# Patient Record
Sex: MALE | Race: WHITE | NOT HISPANIC OR LATINO | Employment: STUDENT | URBAN - METROPOLITAN AREA
[De-identification: names, ages, dates, MRNs, and addresses within clinical notes are randomized per-mention and may not be internally consistent; named-entity substitution may affect disease eponyms.]

---

## 2017-06-16 ENCOUNTER — HOSPITAL ENCOUNTER (EMERGENCY)
Facility: HOSPITAL | Age: 37
Discharge: PSYCHIATRIC OR SUBSTANCE USE FACILITY | End: 2017-06-17
Attending: EMERGENCY MEDICINE | Admitting: EMERGENCY MEDICINE
Payer: COMMERCIAL

## 2017-06-16 DIAGNOSIS — R41.0 CONFUSION: ICD-10-CM

## 2017-06-16 DIAGNOSIS — F32.A DEPRESSION WITH SUICIDAL IDEATION: Primary | ICD-10-CM

## 2017-06-16 DIAGNOSIS — R45.851 DEPRESSION WITH SUICIDAL IDEATION: Primary | ICD-10-CM

## 2017-06-16 LAB
ALBUMIN SERPL BCP-MCNC: 4.4 G/DL
ALP SERPL-CCNC: 82 U/L
ALT SERPL W/O P-5'-P-CCNC: 49 U/L
AMPHET+METHAMPHET UR QL: NEGATIVE
ANION GAP SERPL CALC-SCNC: 15 MMOL/L
APAP SERPL-MCNC: <3 UG/ML
AST SERPL-CCNC: 39 U/L
BARBITURATES UR QL SCN>200 NG/ML: NEGATIVE
BASOPHILS # BLD AUTO: 0.04 K/UL
BASOPHILS NFR BLD: 0.4 %
BENZODIAZ UR QL SCN>200 NG/ML: NORMAL
BILIRUB SERPL-MCNC: 0.7 MG/DL
BILIRUB UR QL STRIP: NEGATIVE
BUN SERPL-MCNC: 11 MG/DL
BZE UR QL SCN: NEGATIVE
CALCIUM SERPL-MCNC: 9.6 MG/DL
CANNABINOIDS UR QL SCN: NORMAL
CHLORIDE SERPL-SCNC: 108 MMOL/L
CLARITY UR REFRACT.AUTO: CLEAR
CO2 SERPL-SCNC: 18 MMOL/L
COLOR UR AUTO: NORMAL
CREAT SERPL-MCNC: 0.9 MG/DL
CREAT UR-MCNC: 49 MG/DL
DIFFERENTIAL METHOD: ABNORMAL
EOSINOPHIL # BLD AUTO: 0.1 K/UL
EOSINOPHIL NFR BLD: 0.8 %
ERYTHROCYTE [DISTWIDTH] IN BLOOD BY AUTOMATED COUNT: 12.4 %
EST. GFR  (AFRICAN AMERICAN): >60 ML/MIN/1.73 M^2
EST. GFR  (NON AFRICAN AMERICAN): >60 ML/MIN/1.73 M^2
ETHANOL SERPL-MCNC: 116 MG/DL
ETHANOL SERPL-MCNC: 164 MG/DL
GLUCOSE SERPL-MCNC: 91 MG/DL
GLUCOSE UR QL STRIP: NEGATIVE
HCT VFR BLD AUTO: 46.5 %
HGB BLD-MCNC: 16.7 G/DL
HGB UR QL STRIP: NEGATIVE
KETONES UR QL STRIP: NEGATIVE
LEUKOCYTE ESTERASE UR QL STRIP: NEGATIVE
LYMPHOCYTES # BLD AUTO: 3.5 K/UL
LYMPHOCYTES NFR BLD: 34.7 %
MCH RBC QN AUTO: 31.9 PG
MCHC RBC AUTO-ENTMCNC: 35.9 %
MCV RBC AUTO: 89 FL
METHADONE UR QL SCN>300 NG/ML: NEGATIVE
MONOCYTES # BLD AUTO: 0.5 K/UL
MONOCYTES NFR BLD: 5 %
NEUTROPHILS # BLD AUTO: 5.9 K/UL
NEUTROPHILS NFR BLD: 58.9 %
NITRITE UR QL STRIP: NEGATIVE
OPIATES UR QL SCN: NEGATIVE
PCP UR QL SCN>25 NG/ML: NEGATIVE
PH UR STRIP: 5 [PH] (ref 5–8)
PLATELET # BLD AUTO: 256 K/UL
PMV BLD AUTO: 11.3 FL
POTASSIUM SERPL-SCNC: 4.1 MMOL/L
PROT SERPL-MCNC: 8.3 G/DL
PROT UR QL STRIP: NEGATIVE
RBC # BLD AUTO: 5.23 M/UL
SODIUM SERPL-SCNC: 141 MMOL/L
SP GR UR STRIP: 1 (ref 1–1.03)
TOXICOLOGY INFORMATION: NORMAL
TROPONIN I SERPL DL<=0.01 NG/ML-MCNC: <0.006 NG/ML
TSH SERPL DL<=0.005 MIU/L-ACNC: 1.31 UIU/ML
URN SPEC COLLECT METH UR: NORMAL
UROBILINOGEN UR STRIP-ACNC: NEGATIVE EU/DL
WBC # BLD AUTO: 10.06 K/UL

## 2017-06-16 PROCEDURE — 85025 COMPLETE CBC W/AUTO DIFF WBC: CPT

## 2017-06-16 PROCEDURE — 81003 URINALYSIS AUTO W/O SCOPE: CPT

## 2017-06-16 PROCEDURE — 93010 ELECTROCARDIOGRAM REPORT: CPT | Mod: S$GLB,,, | Performed by: INTERNAL MEDICINE

## 2017-06-16 PROCEDURE — 80307 DRUG TEST PRSMV CHEM ANLYZR: CPT

## 2017-06-16 PROCEDURE — 80053 COMPREHEN METABOLIC PANEL: CPT

## 2017-06-16 PROCEDURE — 84443 ASSAY THYROID STIM HORMONE: CPT

## 2017-06-16 PROCEDURE — 80329 ANALGESICS NON-OPIOID 1 OR 2: CPT

## 2017-06-16 PROCEDURE — 84484 ASSAY OF TROPONIN QUANT: CPT

## 2017-06-16 PROCEDURE — 80320 DRUG SCREEN QUANTALCOHOLS: CPT | Mod: 91

## 2017-06-16 RX ORDER — OLANZAPINE 5 MG/1
10 TABLET ORAL EVERY 6 HOURS PRN
Status: DISCONTINUED | OUTPATIENT
Start: 2017-06-16 | End: 2017-06-17 | Stop reason: HOSPADM

## 2017-06-16 RX ORDER — DIAZEPAM 5 MG/1
10 TABLET ORAL EVERY 6 HOURS PRN
Status: DISCONTINUED | OUTPATIENT
Start: 2017-06-17 | End: 2017-06-17 | Stop reason: HOSPADM

## 2017-06-16 NOTE — ED PROVIDER NOTES
Encounter Date: 6/16/2017    SCRIBE #1 NOTE: I, Samuel Oseas, am scribing for, and in the presence of, Dr. Gupta.       History     Chief Complaint   Patient presents with    Psychiatric Evaluation     wife had to wrestle gun away from pt; pt disoriented and did know who wife was; did smoke marijuana this morning; did have four beers around 1400; hx of depression, has not taken prozac in two months     Review of patient's allergies indicates:  No Known Allergies  Time seen by provider: 6:16 PM    This is a 36 y.o. male with pertinent PMHx of depression and seizures who presents to the ED after a suicide attempt with a firearm today. Pt states that he has been depressed for some time now and today he attempted suicide with a firearm and his wife wrestled the gun out of his hands and locked it up. Pt notes that he had previously seen a psychiatrist ~ 1 year ago and was put on Prozac but never took it regularly and has not seen a psychiatrist again since. Pt also endorses mariajuana and alcohol (4 beers) use today. Pt states he has been smoking mariajuana for 22 years and drinks frequently though not daily. Pt also endorses chewing tobacco but denies any other drug use. Pt also denies any chronic physical medical problems. Pt does endorse secondary symptoms of subjective low grade fever for some time, chronic shortness of breath at rest, abdominal pain x 2 months with nausea or vomiting though none right now, double vision (described as feeling like his eyes are crossed), and 4 episodes of loose stool in the last 6 days. Pt also feels he is having speech difficulty but family has not noticed any speech abnormalities in the pt. Pt also endorses hematuria one month ago for which was diagnosed as a UTI and he already gotten antibiotics for it. Pt additionally denies homicidal ideation, cough, cold symptoms, chest pain, weakness, or urinary symptoms. Pt also endorses chronic back pain that is at baseline. There are no  other associated symptoms or mitigating/aggravating factors reported at this time.       The history is provided by the patient and the spouse.     Past Medical History:   Diagnosis Date    Depression     Seizures      History reviewed. No pertinent surgical history.  History reviewed. No pertinent family history.  Social History   Substance Use Topics    Smoking status: Never Smoker    Smokeless tobacco: Current User     Types: Chew    Alcohol use Yes     Review of Systems   Constitutional: Positive for fever (low grade subjective). Negative for chills.   HENT: Negative for congestion.    Eyes: Negative for pain.   Respiratory: Positive for shortness of breath. Negative for cough.    Cardiovascular: Negative for chest pain.   Gastrointestinal: Positive for abdominal pain (none now), nausea (none now) and vomiting (none now).   Genitourinary: Negative for difficulty urinating and dysuria.   Musculoskeletal: Negative for back pain and neck pain.   Skin: Negative for rash.   Neurological: Negative for speech difficulty (questionable pt endorses, family denies), weakness and headaches.   Psychiatric/Behavioral: Positive for dysphoric mood and suicidal ideas (attempt today, no HI).       Physical Exam     Initial Vitals [06/16/17 1759]   BP Pulse Resp Temp SpO2   128/85 (!) 114 18 99.2 °F (37.3 °C) 96 %     Physical Exam    Nursing note and vitals reviewed.  Constitutional: He appears well-developed and well-nourished.   HENT:   Head: Normocephalic and atraumatic.   Mouth/Throat: Mucous membranes are dry.   Neck: Neck supple. No thyromegaly present.   Cardiovascular: Normal rate, regular rhythm and intact distal pulses.   Pulmonary/Chest: Breath sounds normal. No respiratory distress. He has no wheezes. He has no rhonchi. He has no rales.   Abdominal: Soft. Bowel sounds are normal. He exhibits no distension. There is no tenderness. There is no rebound and no guarding.   Musculoskeletal: Normal range of motion. He  exhibits no tenderness.   Lymphadenopathy:     He has no cervical adenopathy.   Neurological: He is alert and oriented to person, place, and time. He has normal strength. No cranial nerve deficit or sensory deficit.   Skin: Skin is warm and dry.   Psychiatric: Thought content normal.   Depressed mood/affect.          ED Course   Procedures  Labs Reviewed   CBC W/ AUTO DIFFERENTIAL - Abnormal; Notable for the following:        Result Value    MCH 31.9 (*)     All other components within normal limits   COMPREHENSIVE METABOLIC PANEL - Abnormal; Notable for the following:     CO2 18 (*)     ALT 49 (*)     All other components within normal limits   ALCOHOL,MEDICAL (ETHANOL) - Abnormal; Notable for the following:     Alcohol, Medical, Serum 164 (*)     All other components within normal limits   ACETAMINOPHEN LEVEL - Abnormal; Notable for the following:     Acetaminophen (Tylenol), Serum <3.0 (*)     All other components within normal limits   ALCOHOL,MEDICAL (ETHANOL) - Abnormal; Notable for the following:     Alcohol, Medical, Serum 116 (*)     All other components within normal limits   TSH   DRUG SCREEN PANEL, URINE EMERGENCY    Narrative:     Preferred Collection Type->Urine, Clean Catch   URINALYSIS, REFLEX TO URINE CULTURE    Narrative:     Preferred Collection Type->Urine, Clean Catch   TROPONIN I   TROPONIN I    Narrative:     ADD-ON TROP #[id number] PER SUJEY OLIVERA MD 21:52  06/16/2017      EKG Readings: (Independently Interpreted)   Heart Rate: 101.   Sinus tachycardia, ST depressions and T wave inversions in leads 3 and aVF that are non specific with no other acute ST/T wave changes and no ectopy.        X-Rays:   Independently Interpreted Readings:   Chest X-Ray: Normal cardiac silhouette, no acute infiltrate, no CHF.    CT scan of head:  No acute intracranial abnormality.  Medical Decision Making:   History:   Old Medical Records: I decided to obtain old medical records.  Initial Assessment:   My initial  differential diagnoses include but are not limited to depression, SI, metabolic abnormality, complication of medical illness, emotional state.   Independently Interpreted Test(s):   I have ordered and independently interpreted X-rays - see prior notes.  I have ordered and independently interpreted EKG Reading(s) - see prior notes  Clinical Tests:   Lab Tests: Ordered and Reviewed  Radiological Study: Ordered and Reviewed  Medical Tests: Ordered and Reviewed  Other:   I have discussed this case with another health care provider.            Scribe Attestation:   Scribe #1: I performed the above scribed service and the documentation accurately describes the services I performed. I attest to the accuracy of the note.    Attending Attestation:           Physician Attestation for Scribe:  Physician Attestation Statement for Scribe #1: I, Dr. Gupta, reviewed documentation, as scribed by Samuel Farooq in my presence, and it is both accurate and complete.         Attending ED Notes:   8:24 pm  Pt is now medically stable for discharge from the ED and admission to a psychiatric unit. Pt's urine tox screen was positive for mariajuana and benzodiazepines. Pt's ALT was minimally elevated at 49 but all other LFT's were normal. Pt's blood alcohol is at 164. Labs are otherwise unremarkable, UA is negative and there is no evidence of UTI.   Repeat blood alcohol at 21:45 116.        ED Course   pt will be given zyprexa 10 mg po and then every 6 hours as needed for agitation.  Seen by Psych.  Will need transfer to a Psych facility.  Clinical Impression:   The primary encounter diagnosis was Depression with suicidal ideation. A diagnosis of Confusion was also pertinent to this visit.          Héctor Gupta MD  06/16/17 5947

## 2017-06-16 NOTE — ED NOTES
"Pt arrived from home with own handcuffs on. Per wife, pt stated "please put these on me I don't trust myself." Wife has zimmer to handcuffs   "

## 2017-06-16 NOTE — Clinical Note
Andrea KIRILL Travis should be transferred out to a behavioral health facility.  Patient is medically stable for discharge from the medical emergency department and admission to a psychiatric facility.

## 2017-06-16 NOTE — ED NOTES
APPEARANCE: awake and alert in NAD.  SKIN: warm, dry and intact. No breakdown or bruising.  MUSCULOSKELETAL: Patient moving all extremities spontaneously, no obvious swelling or deformities noted. Ambulates independently.  RESPIRATORY: no shortness of breath. All breath sounds CTA bilaterally.  CARDIAC: heart tones normal. Regular rate and rhythm; 2+ distal pulses; no peripheral edema  ABDOMEN: S/ND/NT, normoactive bowel sounds present in all four quadrants. Normal stool pattern.  : voids spontaneously without difficulty.  Neurologic: AAO x 4; follows commands equal strength in all extremities; denies numbness/tingling.

## 2017-06-17 VITALS
TEMPERATURE: 99 F | HEIGHT: 70 IN | RESPIRATION RATE: 16 BRPM | SYSTOLIC BLOOD PRESSURE: 145 MMHG | DIASTOLIC BLOOD PRESSURE: 80 MMHG | HEART RATE: 78 BPM | OXYGEN SATURATION: 98 % | WEIGHT: 190 LBS | BODY MASS INDEX: 27.2 KG/M2

## 2017-06-17 LAB — ETHANOL SERPL-MCNC: 89 MG/DL

## 2017-06-17 PROCEDURE — 25000003 PHARM REV CODE 250: Performed by: EMERGENCY MEDICINE

## 2017-06-17 PROCEDURE — 99285 EMERGENCY DEPT VISIT HI MDM: CPT | Mod: ,,, | Performed by: EMERGENCY MEDICINE

## 2017-06-17 PROCEDURE — 93005 ELECTROCARDIOGRAM TRACING: CPT

## 2017-06-17 PROCEDURE — 99285 EMERGENCY DEPT VISIT HI MDM: CPT | Mod: 25

## 2017-06-17 PROCEDURE — 80320 DRUG SCREEN QUANTALCOHOLS: CPT

## 2017-06-17 RX ADMIN — OLANZAPINE 10 MG: 10 TABLET, FILM COATED ORAL at 12:06

## 2017-06-17 NOTE — ED NOTES
Security arrived. Two security guards and RN are present to escort pt to vehicle. Patient is calm and cooperative.  called.

## 2017-06-17 NOTE — ED NOTES
SPD arrived. Security called. Pt belongings, PEC and pt documentation given to SPD. Pt is calm and cooperative. Awaiting for security arrival. Notified pt wife (Juliet) of pt transfer to [facility].

## 2017-06-17 NOTE — ED NOTES
pec faxed to Counts include 234 beds at the [facility], [facility], [place], [place], [facility], [facility], [place], [facility], [facility], [facility], [place] [facility], [facility] [place], [facility], [facility], [place], [place],  [place], [place], [place], [place], [place], [facility], [place], [place], [place], [place], [facility], [place], [place], [place], [facility]
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
## 2017-06-17 NOTE — ED NOTES
Still actively seeking placement. PEC re faxed to [facility],  [facility], [place]  [facility], [place] [facility],  [facility], [facility], [facility], [place], [facility], [facility], [facility] [place], [facility] [place], [place], [facility], [place] [facility], [place], [place], [place], [place], [place], [place], [place], [place], [place] Jerem[place], Dequan, Sandor, Rosy, Sumit, Funmi, Ea Rodas

## 2017-06-17 NOTE — ED NOTES
Received call from Kulwant LAN, Pt has been accepted by Dr. Herrera at [facility], located at  [address] [place] [address]. RIKY Blum asked to wait 30 minutes before calling report to  [phone].

## 2017-06-17 NOTE — ED NOTES
[facility], [facility],  [facility], [facility],  [facility], [place] , [place], [facility], [facility], [facility], raz, [facility], [facility],  [facility],  [place]uyen, jose alberto, armida,  [place], [place], [facility],  Sean, tyrone, [place] jami, kulwant, [facility], tika, ti, yaima, keyur nicholson,

## 2020-01-20 ENCOUNTER — HOSPITAL ENCOUNTER (EMERGENCY)
Facility: HOSPITAL | Age: 40
Discharge: HOME | End: 2020-01-20
Attending: EMERGENCY MEDICINE
Payer: COMMERCIAL

## 2020-01-20 VITALS
BODY MASS INDEX: 31.78 KG/M2 | SYSTOLIC BLOOD PRESSURE: 147 MMHG | OXYGEN SATURATION: 97 % | TEMPERATURE: 98 F | RESPIRATION RATE: 20 BRPM | HEART RATE: 71 BPM | DIASTOLIC BLOOD PRESSURE: 99 MMHG | WEIGHT: 222 LBS | HEIGHT: 70 IN

## 2020-01-20 DIAGNOSIS — R51.9 OCCIPITAL HEADACHE: Primary | ICD-10-CM

## 2020-01-20 DIAGNOSIS — R51.9 MIXED HEADACHE: ICD-10-CM

## 2020-01-20 PROCEDURE — 64405 NJX AA&/STRD GR OCPL NRV: CPT | Mod: RT,,, | Performed by: PHYSICIAN ASSISTANT

## 2020-01-20 PROCEDURE — 64405 NJX AA&/STRD GR OCPL NRV: CPT

## 2020-01-20 PROCEDURE — 64405 PR NERVE BLOCK INJ, ANES/STEROID, OCCIPITAL: ICD-10-PCS | Mod: RT,,, | Performed by: PHYSICIAN ASSISTANT

## 2020-01-20 PROCEDURE — 96372 THER/PROPH/DIAG INJ SC/IM: CPT

## 2020-01-20 PROCEDURE — S0020 INJECTION, BUPIVICAINE HYDRO: HCPCS | Performed by: PHYSICIAN ASSISTANT

## 2020-01-20 PROCEDURE — 99284 EMERGENCY DEPT VISIT MOD MDM: CPT | Mod: 25,27

## 2020-01-20 PROCEDURE — 99284 PR EMERGENCY DEPT VISIT,LEVEL IV: ICD-10-PCS | Mod: 25,,, | Performed by: PHYSICIAN ASSISTANT

## 2020-01-20 PROCEDURE — 25000003 PHARM REV CODE 250: Performed by: PHYSICIAN ASSISTANT

## 2020-01-20 PROCEDURE — 99284 EMERGENCY DEPT VISIT MOD MDM: CPT | Mod: 25,,, | Performed by: PHYSICIAN ASSISTANT

## 2020-01-20 RX ORDER — BUPIVACAINE HYDROCHLORIDE 5 MG/ML
5 INJECTION, SOLUTION EPIDURAL; INTRACAUDAL
Status: COMPLETED | OUTPATIENT
Start: 2020-01-20 | End: 2020-01-20

## 2020-01-20 RX ADMIN — BUPIVACAINE HYDROCHLORIDE 25 MG: 5 INJECTION, SOLUTION EPIDURAL; INTRACAUDAL; PERINEURAL at 03:01

## 2020-01-20 NOTE — ED PROVIDER NOTES
Encounter Date: 1/20/2020       History     Chief Complaint   Patient presents with    Headache     r side behind ear for week     Mr Travis is a 39yoM who presents for intermittent headache x1 week; pertinent PMHx h/o depression.  Patient reports 1 week of posterior right-sided headache that occurs intermittently very frequently.  Came on while he was sitting down watching the football game.  About every 10-15 seconds, he experiences a sharp electrolyte pain in the back of his head that radiates forward.  No facial pain or neck pain. Associated with light sensitivity and sound sensitivity.  No history of migraines.  He otherwise has been in usual state of health, has been able to go to work and exercise as usual without worsening of symptoms. Denies associated fever, chills, nausea, vomiting, weakness, numbness, confusion.  No trauma.  The patients available PMH, PSH, Social History, medications, allergies, and triage vital signs were reviewed just prior to their medical evaluation.  A ten point review of systems was completed and is negative except as documented above.  Patient denies any other acute medical complaint.    Please be advised this text was dictated with LearnBIG software and may contain errors due to translation.           Review of patient's allergies indicates:  No Known Allergies  Past Medical History:   Diagnosis Date    Depression     Seizures      No past surgical history on file.  No family history on file.  Social History     Tobacco Use    Smoking status: Never Smoker    Smokeless tobacco: Current User     Types: Chew   Substance Use Topics    Alcohol use: Yes    Drug use: Not on file     Review of Systems   Constitutional: Negative for chills and fever.   Eyes: Positive for photophobia (mild). Negative for visual disturbance.   Respiratory: Negative for shortness of breath.    Cardiovascular: Negative for chest pain.   Gastrointestinal: Negative for abdominal pain, nausea and  vomiting.   Genitourinary: Negative for dysuria.   Musculoskeletal: Negative for neck pain and neck stiffness.   Skin: Negative for rash.   Neurological: Positive for headaches. Negative for dizziness, seizures, syncope, speech difficulty, weakness, light-headedness and numbness.   Psychiatric/Behavioral: Negative for confusion.       Physical Exam     Initial Vitals [01/20/20 1405]   BP Pulse Resp Temp SpO2   (!) 141/85 67 18 98.1 °F (36.7 °C) 98 %      MAP       --         Physical Exam    Vitals reviewed.  Constitutional: He appears well-developed and well-nourished. He is not diaphoretic. No distress.   HENT:   Head: Normocephalic and atraumatic.   Right Ear: External ear normal.   Left Ear: External ear normal.   Mouth/Throat: Oropharynx is clear and moist. No oropharyngeal exudate.   No TTP over temporal artery  + TTP right occipital protuberance   Eyes: Conjunctivae and EOM are normal. Pupils are equal, round, and reactive to light. No scleral icterus.   Mild photophobia b/l, not severe     Neck: Normal range of motion. Neck supple.   No meningismus  No C spine TTP   Cardiovascular: Normal rate, regular rhythm and intact distal pulses.   Pulmonary/Chest: Breath sounds normal. No respiratory distress. He has no wheezes. He has no rhonchi. He has no rales.   Abdominal: Soft.   Musculoskeletal: He exhibits no edema.   Neurological: He is alert and oriented to person, place, and time. He has normal strength. No cranial nerve deficit or sensory deficit.   No focal nor global neuro deficits, finger to nose, heel to shin, romberg negative  Gait nml   Skin: Skin is warm and dry. No rash (none) noted.   Psychiatric: He has a normal mood and affect. His behavior is normal. Judgment and thought content normal.         ED Course   Nerve Block  Date/Time: 1/23/2020 8:55 PM  Performed by: Jeimy Lemons PA-C  Authorized by: Gabriel Mcmillan MD   Pre-operative diagnosis: mixed occipital headache  Post-operative  diagnosis: occipital headache   Indications: pain relief  Body area: head  Nerve: greater occipital  Laterality: right  Patient sedated: no  Preparation: Patient was prepped and draped in the usual sterile fashion.  Patient position: sitting  Needle size: 25 G  Location technique: anatomical landmarks  Local Anesthetic: bupivacaine 0.5% without epinephrine  Anesthetic total: 3 mL  Complications: No  Estimated blood loss (mL): 0  Outcome: pain improved  Patient tolerance: Patient tolerated the procedure well with no immediate complications        Labs Reviewed - No data to display       Imaging Results    None          Medical Decision Making:   History:   Old Medical Records: I decided to obtain old medical records.  Old Records Summarized: records from clinic visits and records from previous admission(s).  Initial Assessment:   Patient presents with pulsating, electric like pain radiating from R occipital protuberance x 1 week, +mild associated photophobia, neuro exam unremarkable, no trauma  Differential Diagnosis:   DDx includes occipital headache, mixed migraine. Physical exam and history taking lower clinical suspicion for  CVA< vertebral artery dissection, meningitis, temporal arteritis.   ED Management:  Clinically, I do not suspect emergent etiology of patients symptoms at this time, no imaging yet indicated. Given greater occipital nerve block with 100% relief of symptoms, including mild photo/phonophobia. Discussed recurrence, PCP f/u, headache specialist if needed. Patient agreed to plan of care and voiced understanding. Discharged in stable condition with strict ED return precautions.    .mmed  01/23/2020    I discussed the following case, diagnosis and plan of care with attending physician.                                   Clinical Impression:       ICD-10-CM ICD-9-CM   1. Occipital headache R51 784.0   2. Mixed headache R51 784.0         Disposition:   Disposition: Discharged  Condition:  Stable                     Jeimy Lemons PA-C  01/23/20 7605

## 2020-01-20 NOTE — ED TRIAGE NOTES
Andrea Travis, an 39 y.o. male presents to the ED with a sharp throbbing headache to the right side of his head.  He said it throbs about ten times a minute and that he is photosensitive.  Denies dizziness.  Patient has had this headache x 1 week as of today.  Unrelieved by Aleve, Ibuprofen, or Tylenol.  Patient denies neck pain, numbness.        Review of patient's allergies indicates:  No Known Allergies  Chief Complaint   Patient presents with    Headache     r side behind ear for week     Past Medical History:   Diagnosis Date    Depression     Seizures

## 2020-01-20 NOTE — DISCHARGE INSTRUCTIONS
Take tylenol and advil if pain returns. Follow up with primary provider, seek neurology referral if headache becomes chronic. Return to the ED if you develop worsening pain, weakness, numbness or vision changes.    Our goal in the emergency department is to always give you outstanding care and exceptional service. You may receive a survey by mail or e-mail in the next week regarding your experience in our ED. We would greatly appreciate your completing and returning the survey. Your feedback provides us with a way to recognize our staff who give very good care and it helps us learn how to improve when your experience was below our aspiration of excellence.

## 2020-03-27 ENCOUNTER — TELEPHONE (OUTPATIENT)
Dept: SPINE | Facility: CLINIC | Age: 40
End: 2020-03-27

## 2020-03-27 NOTE — TELEPHONE ENCOUNTER
----- Message from Lakisha Cunningham PA-C sent at 3/27/2020  9:34 AM CDT -----  Contact: Juliet (spouse)      ----- Message -----  From: Kushal Estrada  Sent: 3/26/2020   2:53 PM CDT  To: Dominic FITZGERALD Staff    Type:  Patient Returning Call    Who Called: Juliet (spouse)    Who Left Message for Patient: Karen    Does the patient know what this is regarding?: yes    Would the patient rather a call back or a response via [facility]? call    Best Call Back Number: [phone]    Additional Information:

## 2020-03-27 NOTE — TELEPHONE ENCOUNTER
Spoke with  Thaddeus    I told her that Dr. Alfaro will be out of the office due to being pulled to work in the hospital but she is offering virtual visits or rescheduling appts to a later date. Juliet stated that patient would rather see her at a visit so it is ok to reschedule him.

## 2020-03-30 ENCOUNTER — TELEPHONE (OUTPATIENT)
Dept: SPINE | Facility: CLINIC | Age: 40
End: 2020-03-30

## 2020-03-30 NOTE — TELEPHONE ENCOUNTER
----- Message from Anu Mijares sent at 3/30/2020  1:22 PM CDT -----  Contact: Juliet (Wife)  Name of Who is Calling : Juliet (Wife)    Juliet (Wife) is requesting a call from staff in regards to getting scheduled for appointment  .....Please contact to further discuss and advise.    Can the clinic reply by [facility] : No    What Number to Call Back :  [phone]

## 2020-06-29 ENCOUNTER — OFFICE VISIT (OUTPATIENT)
Dept: SPINE | Facility: CLINIC | Age: 40
End: 2020-06-29
Attending: PHYSICAL MEDICINE & REHABILITATION
Payer: COMMERCIAL

## 2020-06-29 VITALS
DIASTOLIC BLOOD PRESSURE: 87 MMHG | BODY MASS INDEX: 31.56 KG/M2 | WEIGHT: 220.44 LBS | HEIGHT: 70 IN | SYSTOLIC BLOOD PRESSURE: 126 MMHG | HEART RATE: 95 BPM

## 2020-06-29 DIAGNOSIS — M70.61 GREATER TROCHANTERIC BURSITIS OF RIGHT HIP: Primary | ICD-10-CM

## 2020-06-29 PROCEDURE — 3008F PR BODY MASS INDEX (BMI) DOCUMENTED: ICD-10-PCS | Mod: CPTII,S$GLB,, | Performed by: PHYSICAL MEDICINE & REHABILITATION

## 2020-06-29 PROCEDURE — 99204 PR OFFICE/OUTPT VISIT, NEW, LEVL IV, 45-59 MIN: ICD-10-PCS | Mod: S$GLB,,, | Performed by: PHYSICAL MEDICINE & REHABILITATION

## 2020-06-29 PROCEDURE — 99999 PR PBB SHADOW E&M-EST. PATIENT-LVL IV: ICD-10-PCS | Mod: PBBFAC,,, | Performed by: PHYSICAL MEDICINE & REHABILITATION

## 2020-06-29 PROCEDURE — 99204 OFFICE O/P NEW MOD 45 MIN: CPT | Mod: S$GLB,,, | Performed by: PHYSICAL MEDICINE & REHABILITATION

## 2020-06-29 PROCEDURE — 99999 PR PBB SHADOW E&M-EST. PATIENT-LVL IV: CPT | Mod: PBBFAC,,, | Performed by: PHYSICAL MEDICINE & REHABILITATION

## 2020-06-29 PROCEDURE — 3008F BODY MASS INDEX DOCD: CPT | Mod: CPTII,S$GLB,, | Performed by: PHYSICAL MEDICINE & REHABILITATION

## 2020-06-29 RX ORDER — GABAPENTIN 300 MG/1
300-600 CAPSULE ORAL NIGHTLY
Qty: 60 CAPSULE | Refills: 2 | Status: SHIPPED | OUTPATIENT
Start: 2020-06-29

## 2020-06-29 RX ORDER — HYDROCODONE BITARTRATE AND ACETAMINOPHEN 7.5; 325 MG/1; MG/1
TABLET ORAL
COMMUNITY
Start: 2020-06-25

## 2020-06-29 RX ORDER — METHYLPREDNISOLONE 4 MG/1
TABLET ORAL
Qty: 1 PACKAGE | Refills: 0 | Status: SHIPPED | OUTPATIENT
Start: 2020-06-29 | End: 2020-07-20

## 2020-06-29 NOTE — PROGRESS NOTES
Subjective:      Patient ID: Andrea Travis is a 39 y.o. male.    Chief Complaint: Leg Pain and Hip Pain    Mr Travis is a 40 yo male here for evaluation of chronic hip and back pain.  He had a fall in October and since then he has been having right hip and thigh pain. The pain is on the outside of the thigh and goes up to the back.  It is constant.  It comes mainly in the evening.  He feels like the outer thigh is numb.  The numbness is the outer knee to midthigh on the outside of the leg.  The numbness has been present since October.  He hit on the hip bone.  He said the pain was severe.  The pain is worse with standing, lying on back and right side will make it worse.  He feels better lying on left side.  He has not done anything for it.  It is still there.  He does not feel like advil helps.  He broke his thumb and so was given hydrocodone.  The pain is 4/10 now, worst 10/10 at night, best 2/10 in the morning. The pain is a shooting pain up from midthigh up the side.  It goes up to the ribs on right flank but not into glute and lower back.      Past Medical History:  No date: Depression  No date: Seizures    History reviewed. No pertinent surgical history.    History reviewed.  No pertinent family history.      Social History    Socioeconomic History      Marital status:       Spouse name: Not on file      Number of children: Not on file      Years of education: Not on file      Highest education level: Not on file    Occupational History      Not on file    Social Needs      Financial resource strain: Not on file      Food insecurity        Worry: Not on file        Inability: Not on file      Transportation needs        Medical: Not on file        Non-medical: Not on file    Tobacco Use      Smoking status: Never Smoker      Smokeless tobacco: Current User        Types: Chew    Substance and Sexual Activity      Alcohol use: Yes      Drug use: Not on file      Sexual activity: Not on file     Lifestyle      Physical activity        Days per week: Not on file        Minutes per session: Not on file      Stress: Not on file    Relationships      Social connections        Talks on phone: Not on file        Gets together: Not on file        Attends [employer] service: Not on file        Active member of club or organization: Not on file        Attends meetings of clubs or organizations: Not on file        Relationship status: Not on file    Other Topics      Concerns:        Not on file    Social History Narrative      Not on file      Current Outpatient Medications:  HYDROcodone-acetaminophen (NORCO) 7.5-325 mg per tablet, TK 1 T PO TID FOR 3 DAYS, Disp: , Rfl:     No current facility-administered medications for this visit.       Review of patient's allergies indicates:  No Known Allergies        Review of Systems   Constitution: Negative for weight gain and weight loss.   Cardiovascular: Negative for chest pain.   Respiratory: Negative for shortness of breath.    Musculoskeletal: Positive for back pain (right flank) and joint pain. Negative for joint swelling.   Gastrointestinal: Negative for abdominal pain, bowel incontinence, nausea and vomiting.   Genitourinary: Negative for bladder incontinence.   Neurological: Positive for numbness (right lateral thigh from knee to midthigh).         Objective:        General: Andrea is well-developed, well-nourished, appears stated age, in no acute distress, alert and oriented to time, place and person.     General    Vitals reviewed.  Constitutional: He is oriented to person, place, and time. He appears well-developed and well-nourished.   HENT:   Head: Normocephalic and atraumatic.   Pulmonary/Chest: Effort normal.   Neurological: He is alert and oriented to person, place, and time.   Psychiatric: He has a normal mood and affect. His behavior is normal. Judgment and thought content normal.     General Musculoskeletal Exam   Gait: normal     Right Ankle/Foot Exam      Tests   Heel Walk: able to perform  Tiptoe Walk: able to perform    Left Ankle/Foot Exam     Tests   Heel Walk: able to perform  Tiptoe Walk: able to perform      Right Hip Exam     Tenderness   The patient tender to palpation of the SI joint, trochanteric bursa and piriformis.    Range of Motion   External rotation: normal   Internal rotation: normal     Tests   Pain w/ forced internal rotation (HALLE): present (right side pain)  Back (L-Spine & T-Spine) / Neck (C-Spine) Exam     Back (L-Spine & T-Spine) Range of Motion   Extension: 40   Flexion: 70 (with pain)   Lateral bend right: 20   Lateral bend left: 10 (with pain on right)   Rotation right: 40   Rotation left: 40     Spinal Sensation   Right Side Sensation  C-Spine Level: normal   L-Spine Level: normal  S-Spine Level: normal  Left Side Sensation  C-Spine Level: normal  L-Spine Level: normal  S-Spine Level: normal    Back (L-Spine & T-Spine) Tests   Right Side Tests  Straight leg raise:      Sitting SLR: > 70 degrees      Left Side Tests  Straight leg raise:     Sitting SLR: > 70 degrees          Other He has no scoliosis .  Spinal Kyphosis:  Absent      Muscle Strength   Right Upper Extremity   Biceps: 5/5/5   Deltoid:  5/5  Triceps:  5/5  Wrist extension: 5/5/5   Finger Flexors:  5/5  Left Upper Extremity  Biceps: 5/5/5   Deltoid:  5/5  Triceps:  5/5  Wrist extension: 5/5/5   Finger Flexors:  5/5  Right Lower Extremity   Hip Flexion: 5/5   Quadriceps:  5/5   Anterior tibial:  5/5/5  EHL:  5/5  Left Lower Extremity   Hip Flexion: 5/5   Quadriceps:  5/5   Anterior tibial:  5/5/5   EHL:  5/5    Reflexes     Left Side  Biceps:  2+  Triceps:  2+  Brachioradialis:  2+  Quadriceps:  2+  Achilles:  2+  Left Gallagher's Sign:  Absent  Babinski Sign:  absent    Right Side   Biceps:  2+  Triceps:  2+  Brachioradialis:  2+  Quadriceps:  2+  Achilles:  2+  Right Gallagher's Sign:  absent  Babinski Sign:  absent    Vascular Exam     Right Pulses        Carotid:                   2+    Left Pulses        Carotid:                  2+              Assessment:       1. Greater trochanteric bursitis of right hip           Plan:       Orders Placed This Encounter    X-Ray Lumbar Complete With Flex And Ext    X-Ray Hip 2 View Right    Ambulatory referral/consult to Physical/Occupational Therapy    gabapentin (NEURONTIN) 300 MG capsule    methylPREDNISolone (MEDROL DOSEPACK) 4 mg tablet        1. We discussed hip pain and the nature of hip pain.  He is not really having back pain.  I think all his pain is from the bursa and falling on it.  He does have tenderness over the trochanteric bursa  We discussed lateral thigh numbness is not likely from a nerve in back.  It is likely superficial nerve.    2. We discussed posture sitting and the importance of trying to sit better.   3. We discussed the benefits of therapy and exercise and continuing to move.  We discussed some stretches for ITB.  We discussed foam roller, and PT  4. PT for back and core strengthening, ITB stretches, and glute and quad strengthening, and myofascial relase at [facility]  5. Discussed bursa injection he wants to wait  6. X-ray lumbar spine and hip today  7. Medrol dose dwayne  8. Gabapentin 300-600 at night  9. RTC 3 months    More than 50% of the total time  of 45 minutes was spent face to face in counseling on diagnosis and treatment options. I also counseled patient  on common and most usual side effect of prescribed medications.  I reviewed Primary care , and other specialty's notes to better coordinate patient's care. All questions were answered, and patient voiced understanding.         Follow-up: Follow up in about 3 months (around 9/29/2020). If there are any questions prior to this, the patient was instructed to contact the office.

## 2020-07-01 ENCOUNTER — HOSPITAL ENCOUNTER (OUTPATIENT)
Dept: RADIOLOGY | Facility: HOSPITAL | Age: 40
Discharge: HOME | End: 2020-07-01
Attending: PHYSICAL MEDICINE & REHABILITATION
Payer: COMMERCIAL

## 2020-07-01 DIAGNOSIS — M70.61 GREATER TROCHANTERIC BURSITIS OF RIGHT HIP: ICD-10-CM

## 2020-07-01 PROCEDURE — 72110 X-RAY EXAM L-2 SPINE 4/>VWS: CPT | Mod: TC,PN

## 2020-07-01 PROCEDURE — 73502 X-RAY EXAM HIP UNI 2-3 VIEWS: CPT | Mod: 26,RT,, | Performed by: RADIOLOGY

## 2020-07-01 PROCEDURE — 72110 X-RAY EXAM L-2 SPINE 4/>VWS: CPT | Mod: 26,,, | Performed by: RADIOLOGY

## 2020-07-01 PROCEDURE — 73502 X-RAY EXAM HIP UNI 2-3 VIEWS: CPT | Mod: TC,PN,RT

## 2020-07-01 PROCEDURE — 73502 XR HIP 2 VIEW RIGHT: ICD-10-PCS | Mod: 26,RT,, | Performed by: RADIOLOGY

## 2020-07-01 PROCEDURE — 72110 XR LUMBAR SPINE 5 VIEW WITH FLEX AND EXT: ICD-10-PCS | Mod: 26,,, | Performed by: RADIOLOGY

## 2020-07-09 ENCOUNTER — CLINICAL SUPPORT (OUTPATIENT)
Dept: REHABILITATION | Facility: HOSPITAL | Age: 40
End: 2020-07-09
Attending: PHYSICAL MEDICINE & REHABILITATION
Payer: COMMERCIAL

## 2020-07-09 DIAGNOSIS — M62.9 HAMSTRING TIGHTNESS OF BOTH LOWER EXTREMITIES: ICD-10-CM

## 2020-07-09 DIAGNOSIS — M25.651 DECREASED RANGE OF MOTION OF BOTH HIPS: ICD-10-CM

## 2020-07-09 DIAGNOSIS — R25.9 DYSFUNCTIONAL MOVEMENTS: ICD-10-CM

## 2020-07-09 DIAGNOSIS — R26.89 DECREASED BACK MOBILITY: ICD-10-CM

## 2020-07-09 DIAGNOSIS — R52 PAIN AGGRAVATED BY LIFTING: ICD-10-CM

## 2020-07-09 DIAGNOSIS — M25.652 DECREASED RANGE OF MOTION OF BOTH HIPS: ICD-10-CM

## 2020-07-09 PROCEDURE — 97161 PT EVAL LOW COMPLEX 20 MIN: CPT | Mod: PO | Performed by: PHYSICAL THERAPIST

## 2020-07-09 NOTE — PROGRESS NOTES
[facility]  Physical Therapy Initial Evaluation    Name: Andrea Travis  Clinic Number: [id number]    Therapy Diagnosis:   Decreased range of motion both hips   Decreased back mobility  Hamstring tightness of both lower extremities  Dysfunctional movements  Pain aggravated by lifting    Physician: Valerie Alfaro, *    Physician Orders: PT Eval and Treat hips  Medical Diagnosis from Referral:   M70.61 (ICD-10-CM) - Greater trochanteric bursitis of right hip  Evaluation Date: 7/9/2020  Authorization Period Expiration: 12/31/2020  Plan of Care Expiration: 10/9/2020  Visit # / Visits authorized: 1/ 20    Time In: 1715  Time Out: 1610  Total Billable Time: 55  minutes    Precautions: Standard    Subjective      Medical History:   Past Medical History:   Diagnosis Date    Depression     Seizures        Surgical History:   Andrea Travis  has no past surgical history on file.    Medications:   Andrea has a current medication list which includes the following prescription(s): gabapentin, hydrocodone-acetaminophen, and methylprednisolone.    Allergies:   Review of patient's allergies indicates:  No Known Allergies        History of current condition - Andrea reports: he presently is experiencing a dull pain on the right side hip and side.  The pain is constant.   Date of onset: October 2019. Patient fell at home in his living room. No immediate pain. Progressively got worse. Currently it is unchanged.       Pain:  Current 6/10, worst 9/10, best 3/10   Location: right hip area from the hip upward to the iliac crest and right flank.   Description: Dull and Tingling  Aggravating Factors: Standing and Laying on the right side.   Easing Factors: walking, moving around  Sleep  - disturbed   B/B function - negative  Cough/ sneeze/ strain - + sneeze > pain   Current Level of Function:   Personal - no limitation   Domestic - lifting groceries or anything from the ground level.  Community  / social - no limitation   Occupation - not applicable at present   Recreation / fitness / sports - not able to go to the gym but able to exercise on home elliptical ~ 45 min   Prior Therapy: no   Social History:  lives with their spouse in a condominium. Stairs, outdoors, but also elevator use   Occupation: not employed  Prior Level of Function: no limitation prior to accident   Pts goals: to get a good night sleep and have feeling return right side of the thigh.   Imaging, X-Rays : 6/29/2020   FINDINGS:  Hip joint spaces appear adequately maintained on both sides, without significant narrowing on either side.  No conventional radiographic evidence to specifically suggest avascular necrosis of either femoral.  No evidence of recent or healing fracture or lytic destructive process.  SI joints appear unremarkable.  FINDINGS:  Five views: There is mild anterolisthesis of L4 on L5.  There is mild DJD.  No fracture dislocation bone destruction seen.  No instability seen.  No pars defects are seen.       Objective     Posture Alignment: decreased lumbar lordosis high left shoulder.  Sensation: Light Touch: Impaired: mid third of the right lateral thigh to light touch and sharp pain   Palpation: top nof the iliac crest, superior margin greater trochanter, right iliolumbar area.       Lumbar/Thoracic AROM: Pain/Dysfunction with Movement:   Flexion                          60/40=20 DP- hip area    Extension                      20/10=10 DP right iliolumbar    Right side bending            20 P right iliolumbar    Left side bending               30 NP    Right rotation DP - right iliac area    Left rotation DN       LOWER EXTREMITY STRENGTH:   Left  5/5 Right 5/5       DTR's:   Left Right   Patella Tendon 2+ 2+   Achilles Tendon 2+ 2+           Selective Functional Movement Assessment:  FN: functional, non-painful  FP: functional, painful  DP: dysfunctional, painful  DN: dysfunctional, non-painful    Multi-Segmental  Flexion: see above   Cannot touch toes  ? Sacral angle <70 degrees  ? Non-uniform spine curve  Multi-Segmental Extension: see above   ASIS does not clear toes  ? Spine of scapula does not clear heels  ? Non-Uniform spine curve  ? Excessive effort and/or lack motor control    Multi-Segmental Rotation:   Right:see above   Left:see above     FUNCTIONAL MOVEMENT BREAKOUTS:  Long sitting  = DP sacral angle 60 degrees  SLR   -Active   R - DN  45  L -  DN  45  Stabilized SLR   R - DN  L - DN  -Passive  R - DP 60  L - DN  Knees to chest   Active - DN 90     Press up  - DN    Hip extension   Active  R - FN  35  L - FN   40  Passive   R - FN  50  L - FN   50  Hip rotation   Internal  rotation  active  R 25  Passive  R 30  External  rotation   Active   R - 45   Passive   R - 50    GAIT: ambulates with no assistive device with independently.     GAIT DEVIATIONS: displays no deviations         CMS Impairment/Limitation/Restriction for FOTO hip Survey    Therapist reviewed FOTO scores for Andrea Travis on 7/9/2020.   FOTO documents entered into [employer] - see Media section.    Limitation Score: 51%  Category: Mobility    Current : CK = at least 40% but < 60% impaired, limited or restricted  Goal: CJ = at least 20% but < 40% impaired, limited or restricted               Assessment     Andrea is a 39 y.o. male referred to outpatient Physical Therapy with a medical diagnosis of Greater trochanteric bursitis of right hip. Pt presents with clinical findings of a hip flexion mobility dysfunction, hip extension stability and motor control dysfunction, spine flexion and extension pain dysfunction and hip internal rotation mobility dysfunction.      Evaluation has determined a decrease in functional status and subjective and objective deficits that can be addressed by physical therapy intervention. Pt demonstrates pain limiting functional activities. Decreased flexibility and strength limiting normal movement patterns.  Decreased  postural strength and awareness.  Subjective and objective measures are addressed by goals in the plan of care. Patient/family are involved in the development of these goals. Patient/family are educated about current injury and treatment.    .Current impairments limits patient with all functional activities.   Pt prognosis is Good.   Pt will benefit from skilled outpatient Physical Therapy to address the deficits stated above and in the chart below, provide pt/family education, and to maximize pt's level of independence.     Plan of care discussed with patient: Yes  Pt's spiritual, cultural and educational needs considered and patient is agreeable to the plan of care and goals as stated below:       Anticipated Barriers for therapy: none     Medical Necessity is demonstrated by the following  History  Co-morbidities and personal factors that may impact the plan of care Co-morbidities:     has a past medical history of Depression and Seizures.    Personal Factors:   no deficits     low   Examination  Body Structures and Functions, activity limitations and participation restrictions that may impact the plan of care Body Regions:   back  hip     Body Systems:    musculoskeletal    Participation Restrictions:   none    Activity limitations:   Learning and applying knowledge  no deficits    General Tasks and Commands  no deficits    Communication  no deficits    Mobility  lifting and carrying objects    Self care  no deficits    Domestic Life  no deficits    Interactions/Relationships  no deficits    Life Areas  no deficits    Community and Social Life  no deficits         low   Clinical Presentation stable and uncomplicated low   Decision Making/ Complexity Score: low         Short Term Goals: 5 weeks   1. Pts pain decreased 20 - 40% for improved functional mobility and quality of life  2. Pts PROM in hip flexion increased by 10 degrees  to enhance AROM and functional mobility  3. Pts strength in the hip musculature  increased by 1/2 grade to facilitate improve active mobility in extension and abduction and functional mobility.   4. Pt to exhibit correct return demonstration of exercises for self-management and independence with HEP  5. Patient to achieve increased passive SLR increased by 10 to 15 degrees BLE for increased   Hamstring flexibility.  6. Pt to achieve increased lumbar extension by 10 degrees to restore functional stability.   Long Term Goals : 10 weeks   1. Pts pain level decreased to 75% or greater with standing activities for restoring functional mobility and ADL.  2. Pts sacral angle increased by 10 degrees or greater to increase AROM in lumbar flexion by 10 degrees to restore functional mobility and ADL  3. Pts strength in the hip musculature increased by one grade to facilitate improved active hip extension mobility and functional stability  4. Pt will be independent with HEP for self-management.   5. Pt will increase active hip internal rotation by 5 degrees for restoring functional mobility.   6. Pt to demonstrate increased thoracic ROM to 50 degrees rotation for improved functional mobility.          Plan       Plan of care Certification: 7/9/2020 to 10/9/2020.    Outpatient Physical Therapy 2 times weekly for 10 weeks to include the following interventions: Manual Therapy, Patient Education and Therapeutic Exercise.     Iam Schmitt, PT      Therapist: Iam Schmitt, PT

## 2020-07-15 NOTE — PLAN OF CARE
[facility]  Physical Therapy Initial Evaluation    Name: Andrea Travis  Clinic Number: [id number]    Therapy Diagnosis:   Decreased range of motion both hips   Decreased back mobility  Hamstring tightness of both lower extremities  Dysfunctional movements  Pain aggravated by lifting    Physician: Valerie Alfaro, *    Physician Orders: PT Eval and Treat hips  Medical Diagnosis from Referral:   M70.61 (ICD-10-CM) - Greater trochanteric bursitis of right hip  Evaluation Date: 7/9/2020  Authorization Period Expiration: 12/31/2020  Plan of Care Expiration: 10/9/2020  Visit # / Visits authorized: 1/ 20    Time In: 1715  Time Out: 1610  Total Billable Time: 55  minutes    Precautions: Standard    Subjective      Medical History:   Past Medical History:   Diagnosis Date    Depression     Seizures        Surgical History:   Andrea Travis  has no past surgical history on file.    Medications:   Andrea has a current medication list which includes the following prescription(s): gabapentin, hydrocodone-acetaminophen, and methylprednisolone.    Allergies:   Review of patient's allergies indicates:  No Known Allergies        History of current condition - Andrea reports: he presently is experiencing a dull pain on the right side hip and side.  The pain is constant.   Date of onset: October 2019. Patient fell at home in his living room. No immediate pain. Progressively got worse. Currently it is unchanged.       Pain:  Current 6/10, worst 9/10, best 3/10   Location: right hip area from the hip upward to the iliac crest and right flank.   Description: Dull and Tingling  Aggravating Factors: Standing and Laying on the right side.   Easing Factors: walking, moving around  Sleep  - disturbed   B/B function - negative  Cough/ sneeze/ strain - + sneeze > pain   Current Level of Function:   Personal - no limitation   Domestic - lifting groceries or anything from the ground level.  Community  / social - no limitation   Occupation - not applicable at present   Recreation / fitness / sports - not able to go to the gym but able to exercise on home elliptical ~ 45 min   Prior Therapy: no   Social History:  lives with their spouse in a condominium. Stairs, outdoors, but also elevator use   Occupation: not employed  Prior Level of Function: no limitation prior to accident   Pts goals: to get a good night sleep and have feeling return right side of the thigh.   Imaging, X-Rays : 6/29/2020   FINDINGS:  Hip joint spaces appear adequately maintained on both sides, without significant narrowing on either side.  No conventional radiographic evidence to specifically suggest avascular necrosis of either femoral.  No evidence of recent or healing fracture or lytic destructive process.  SI joints appear unremarkable.  FINDINGS:  Five views: There is mild anterolisthesis of L4 on L5.  There is mild DJD.  No fracture dislocation bone destruction seen.  No instability seen.  No pars defects are seen.       Objective     Posture Alignment: decreased lumbar lordosis high left shoulder.  Sensation: Light Touch: Impaired: mid third of the right lateral thigh to light touch and sharp pain   Palpation: top nof the iliac crest, superior margin greater trochanter, right iliolumbar area.       Lumbar/Thoracic AROM: Pain/Dysfunction with Movement:   Flexion                          60/40=20 DP- hip area    Extension                      20/10=10 DP right iliolumbar    Right side bending            20 P right iliolumbar    Left side bending               30 NP    Right rotation DP - right iliac area    Left rotation DN       LOWER EXTREMITY STRENGTH:   Left  5/5 Right 5/5       DTR's:   Left Right   Patella Tendon 2+ 2+   Achilles Tendon 2+ 2+           Selective Functional Movement Assessment:  FN: functional, non-painful  FP: functional, painful  DP: dysfunctional, painful  DN: dysfunctional, non-painful    Multi-Segmental  Flexion: see above   Cannot touch toes  ? Sacral angle <70 degrees  ? Non-uniform spine curve  Multi-Segmental Extension: see above   ASIS does not clear toes  ? Spine of scapula does not clear heels  ? Non-Uniform spine curve  ? Excessive effort and/or lack motor control    Multi-Segmental Rotation:   Right:see above   Left:see above     FUNCTIONAL MOVEMENT BREAKOUTS:  Long sitting  = DP sacral angle 60 degrees  SLR   -Active   R - DN  45  L -  DN  45  Stabilized SLR   R - DN  L - DN  -Passive  R - DP 60  L - DN  Knees to chest   Active - DN 90     Press up  - DN    Hip extension   Active  R - FN  35  L - FN   40  Passive   R - FN  50  L - FN   50  Hip rotation   Internal  rotation  active  R 25  Passive  R 30  External  rotation   Active   R - 45   Passive   R - 50    GAIT: ambulates with no assistive device with independently.     GAIT DEVIATIONS: displays no deviations         CMS Impairment/Limitation/Restriction for FOTO hip Survey    Therapist reviewed FOTO scores for Andrea Travis on 7/9/2020.   FOTO documents entered into "[employer] - see Media section.    Limitation Score: 51%  Category: Mobility    Current : CK = at least 40% but < 60% impaired, limited or restricted  Goal: CJ = at least 20% but < 40% impaired, limited or restricted               Assessment     Andrea is a 39 y.o. male referred to outpatient Physical Therapy with a medical diagnosis of Greater trochanteric bursitis of right hip. Pt presents with clinical findings of a hip flexion mobility dysfunction, hip extension stability and motor control dysfunction, spine flexion and extension pain dysfunction and hip internal rotation mobility dysfunction.      Evaluation has determined a decrease in functional status and subjective and objective deficits that can be addressed by physical therapy intervention. Pt demonstrates pain limiting functional activities. Decreased flexibility and strength limiting normal movement patterns.  Decreased  postural strength and awareness.  Subjective and objective measures are addressed by goals in the plan of care. Patient/family are involved in the development of these goals. Patient/family are educated about current injury and treatment.    .Current impairments limits patient with all functional activities.   Pt prognosis is Good.   Pt will benefit from skilled outpatient Physical Therapy to address the deficits stated above and in the chart below, provide pt/family education, and to maximize pt's level of independence.     Plan of care discussed with patient: Yes  Pt's spiritual, cultural and educational needs considered and patient is agreeable to the plan of care and goals as stated below:       Anticipated Barriers for therapy: none     Medical Necessity is demonstrated by the following  History  Co-morbidities and personal factors that may impact the plan of care Co-morbidities:     has a past medical history of Depression and Seizures.    Personal Factors:   no deficits     low   Examination  Body Structures and Functions, activity limitations and participation restrictions that may impact the plan of care Body Regions:   back  hip     Body Systems:    musculoskeletal    Participation Restrictions:   none    Activity limitations:   Learning and applying knowledge  no deficits    General Tasks and Commands  no deficits    Communication  no deficits    Mobility  lifting and carrying objects    Self care  no deficits    Domestic Life  no deficits    Interactions/Relationships  no deficits    Life Areas  no deficits    Community and Social Life  no deficits         low   Clinical Presentation stable and uncomplicated low   Decision Making/ Complexity Score: low         Short Term Goals: 5 weeks   1. Pts pain decreased 20 - 40% for improved functional mobility and quality of life  2. Pts PROM in hip flexion increased by 10 degrees  to enhance AROM and functional mobility  3. Pts strength in the hip musculature  increased by 1/2 grade to facilitate improve active mobility in extension and abduction and functional mobility.   4. Pt to exhibit correct return demonstration of exercises for self-management and independence with HEP  5. Patient to achieve increased passive SLR increased by 10 to 15 degrees BLE for increased   Hamstring flexibility.  6. Pt to achieve increased lumbar extension by 10 degrees to restore functional stability.   Long Term Goals : 10 weeks   1. Pts pain level decreased to 75% or greater with standing activities for restoring functional mobility and ADL.  2. Pts sacral angle increased by 10 degrees or greater to increase AROM in lumbar flexion by 10 degrees to restore functional mobility and ADL  3. Pts strength in the hip musculature increased by one grade to facilitate improved active hip extension mobility and functional stability  4. Pt will be independent with HEP for self-management.   5. Pt will increase active hip internal rotation by 5 degrees for restoring functional mobility.   6. Pt to demonstrate increased thoracic ROM to 50 degrees rotation for improved functional mobility.          Plan       Plan of care Certification: 7/9/2020 to 10/9/2020.    Outpatient Physical Therapy 2 times weekly for 10 weeks to include the following interventions: Manual Therapy, Patient Education and Therapeutic Exercise.     Iam Schmitt, PT      Therapist: Iam Schmitt, PT

## 2020-07-30 ENCOUNTER — CLINICAL SUPPORT (OUTPATIENT)
Dept: REHABILITATION | Facility: HOSPITAL | Age: 40
End: 2020-07-30
Attending: PHYSICAL MEDICINE & REHABILITATION
Payer: COMMERCIAL

## 2020-07-30 DIAGNOSIS — R26.89 DECREASED BACK MOBILITY: ICD-10-CM

## 2020-07-30 DIAGNOSIS — M25.651 DECREASED RANGE OF MOTION OF BOTH HIPS: ICD-10-CM

## 2020-07-30 DIAGNOSIS — M62.9 HAMSTRING TIGHTNESS OF BOTH LOWER EXTREMITIES: ICD-10-CM

## 2020-07-30 DIAGNOSIS — M25.652 DECREASED RANGE OF MOTION OF BOTH HIPS: ICD-10-CM

## 2020-07-30 PROCEDURE — 97110 THERAPEUTIC EXERCISES: CPT | Mod: PO,CQ

## 2020-07-30 NOTE — PROGRESS NOTES
Physical Therapy Daily Treatment Note     Name: Andrea Barnes [facility] Number: [id number]    Therapy Diagnosis:   Encounter Diagnoses   Name Primary?    Decreased range of motion of both hips     Decreased back mobility     Hamstring tightness of both lower extremities      Physician: Valerie Alfaro, *    Visit Date: 7/30/2020    Physician Orders: PT Eval and Treat hips  Medical Diagnosis from Referral:   M70.61 (ICD-10-CM) - Greater trochanteric bursitis of right hip  Evaluation Date: 7/9/2020  Authorization Period Expiration: 12/31/2020  Plan of Care Expiration: 10/9/2020  Visit # / Visits authorized: 2 / 20  PTA Visit Number: 1    Time In: 04:15 pm   Time Out: 04:50 pm   Total Billable Time: 35  minutes  Charges: TE - 2     Precautions: Standard    Subjective     Pt reports: increased hip pain with certain motions.   He was compliant with home exercise program.  Response to previous treatment:   Functional change: None     Pain: 4/10  Location: right hip        Objective     Andrea received therapeutic exercises to develop strength, endurance, ROM, flexibility and core stabilization for 35 minutes including:    Upright bike: x 8 minutes  Hamstring stretch with strap: 3 x 30 seconds  Piriformis stretch: 3 x 30 seconds   Supine hip flexor strength off edge of bed: 3 x 30 seconds   Supine ball roll ins: orange PB x 20       RLE distraction: x 5 minutes   Hip distraction    Andrea received hot pack for 10 minutes to right hip         Home Exercises Provided and Patient Education Provided     Education provided:   -     Written Home Exercises Provided: Patient instructed to cont prior HEP.  Exercises were reviewed and Andrea was able to demonstrate them prior to the end of the session.  Andrea demonstrated good  understanding of the education provided.     See EMR under Patient Instructions for exercises provided prior visit.    Assessment     Patient tolerated therapy session fairly well. Patient with  significant range of motion and mobility deficits and will continue to benefit from skilled physical therapy to address deficits.    Andrea is progressing well towards his goals.   Pt prognosis is Good.     Pt will continue to benefit from skilled outpatient physical therapy to address the deficits listed in the problem list box on initial evaluation, provide pt/family education and to maximize pt's level of independence in the home and community environment.     Pt's spiritual, cultural and educational needs considered and pt agreeable to plan of care and goals.     Anticipated barriers to physical therapy: None     Goals:     Short Term Goals: 5 weeks   1. Pts pain decreased 20 - 40% for improved functional mobility and quality of life  2. Pts PROM in hip flexion increased by 10 degrees  to enhance AROM and functional mobility  3. Pts strength in the hip musculature increased by 1/2 grade to facilitate improve active mobility in extension and abduction and functional mobility.   4. Pt to exhibit correct return demonstration of exercises for self-management and independence with HEP  5. Patient to achieve increased passive SLR increased by 10 to 15 degrees BLE for increased   Hamstring flexibility.  6. Pt to achieve increased lumbar extension by 10 degrees to restore functional stability.   Long Term Goals : 10 weeks   1. Pts pain level decreased to 75% or greater with standing activities for restoring functional mobility and ADL.  2. Pts sacral angle increased by 10 degrees or greater to increase AROM in lumbar flexion by 10 degrees to restore functional mobility and ADL  3. Pts strength in the hip musculature increased by one grade to facilitate improved active hip extension mobility and functional stability  4. Pt will be independent with HEP for self-management.   5. Pt will increase active hip internal rotation by 5 degrees for restoring functional mobility.   6. Pt to demonstrate increased thoracic ROM to 50  degrees rotation for improved functional mobility.      Plan     Cont with PT POC as tolerated     Miriam Vuong PTA

## 2020-08-08 ENCOUNTER — PATIENT MESSAGE (OUTPATIENT)
Dept: SPINE | Facility: CLINIC | Age: 40
End: 2020-08-08

## 2020-08-10 ENCOUNTER — CLINICAL SUPPORT (OUTPATIENT)
Dept: REHABILITATION | Facility: HOSPITAL | Age: 40
End: 2020-08-10
Attending: PHYSICAL MEDICINE & REHABILITATION
Payer: COMMERCIAL

## 2020-08-10 DIAGNOSIS — M62.9 HAMSTRING TIGHTNESS OF BOTH LOWER EXTREMITIES: ICD-10-CM

## 2020-08-10 DIAGNOSIS — M25.651 DECREASED RANGE OF MOTION OF BOTH HIPS: ICD-10-CM

## 2020-08-10 DIAGNOSIS — R26.89 DECREASED BACK MOBILITY: ICD-10-CM

## 2020-08-10 DIAGNOSIS — M25.652 DECREASED RANGE OF MOTION OF BOTH HIPS: ICD-10-CM

## 2020-08-10 PROCEDURE — 97110 THERAPEUTIC EXERCISES: CPT | Mod: PO | Performed by: PHYSICAL THERAPIST

## 2020-08-10 NOTE — PROGRESS NOTES
"  Physical Therapy Daily Treatment Note     Name: Andrea Barnes [facility] Number: [id number]    Therapy Diagnosis:   Encounter Diagnoses   Name Primary?    Decreased range of motion of both hips     Decreased back mobility     Hamstring tightness of both lower extremities      Physician: Valerie Alfaro, *    Visit Date: 8/10/2020    Physician Orders: PT Eval and Treat hips  Medical Diagnosis from Referral:   M70.61 (ICD-10-CM) - Greater trochanteric bursitis of right hip  Evaluation Date: 7/9/2020  Authorization Period Expiration: 12/31/2020  Plan of Care Expiration: 10/9/2020  Visit # / Visits authorized: 2 / 20  PTA Visit Number: 1    Time In: 1340 pm   Time Out: 1430 pm   Total Billable Time: 50  minutes  Charges: TE -  3    Precautions: Standard    Subjective     Pt reports: the shooting pain is in the right hip to the rib cage. It is constant. Woke me up earlier. Cant think of anything that I did to provoke the hip.   He was compliant with home exercise program.  Response to previous treatment:   Functional change: None     Pain: 5 /10  Location: right hip        Objective     Andrea received therapeutic exercises to develop strength, endurance, ROM, flexibility and core stabilization for 50  minutes including:    .BOLD INDICATES ACTIVITIES PERFORMED      Upright bike: x 8 minutes    Hamstring stretch with strap: 10" x 6  Piriformis stretch: 10" x 9   Supine hip flexor strength off edge of bed: 3 x 30 seconds   Supine T-ball extension     Prone   -hip extension x10  -donkey kick x10  -hip abduction swing x10  -sust exten 3'   -Prone rocking / prayer stretch 10" ea x 3     Side lying   -abduction x12  -adduction x12  -clams / reverse clams x12      Supine ball roll ins: orange PB x 20     MANUAL THERAPY:  RLE distraction: Hip distraction     MODALITY:  Andrea received         Home Exercises Provided and Patient Education Provided     Education provided:   -   Written Home Exercises Provided: Patient " instructed to cont prior HEP.  Exercises were reviewed and Andrea was able to demonstrate them prior to the end of the session.  Andrea demonstrated good  understanding of the education provided.     See EMR under Patient Instructions for exercises provided prior visit.    Assessment     The patient performed and completed the activities noted. He did not demonstrate any difficulty and did not report increased pain at the end of the session. His response to prone rocking for facilitating spine flexion mobility was positive and the activity effective  Will progress with hip strengthening activities.     Andrea is progressing well towards his goals.   Pt prognosis is Good.     Pt will continue to benefit from skilled outpatient physical therapy to address the deficits listed in the problem list box on initial evaluation, provide pt/family education and to maximize pt's level of independence in the home and community environment.     Pt's spiritual, cultural and educational needs considered and pt agreeable to plan of care and goals.     Anticipated barriers to physical therapy: None     Goals:     Short Term Goals: 5 weeks   1. Pts pain decreased 20 - 40% for improved functional mobility and quality of life  2. Pts PROM in hip flexion increased by 10 degrees  to enhance AROM and functional mobility  3. Pts strength in the hip musculature increased by 1/2 grade to facilitate improve active mobility in extension and abduction and functional mobility.   4. Pt to exhibit correct return demonstration of exercises for self-management and independence with HEP  5. Patient to achieve increased passive SLR increased by 10 to 15 degrees BLE for increased   Hamstring flexibility.  6. Pt to achieve increased lumbar extension by 10 degrees to restore functional stability.   Long Term Goals : 10 weeks   1. Pts pain level decreased to 75% or greater with standing activities for restoring functional mobility and ADL.  2. Pts sacral  angle increased by 10 degrees or greater to increase AROM in lumbar flexion by 10 degrees to restore functional mobility and ADL  3. Pts strength in the hip musculature increased by one grade to facilitate improved active hip extension mobility and functional stability  4. Pt will be independent with HEP for self-management.   5. Pt will increase active hip internal rotation by 5 degrees for restoring functional mobility.   6. Pt to demonstrate increased thoracic ROM to 50 degrees rotation for improved functional mobility.      Plan     Cont with PT POC as tolerated     Iam Schmitt, PT

## 2020-08-11 RX ORDER — NAPROXEN 500 MG/1
500 TABLET ORAL 2 TIMES DAILY
Qty: 60 TABLET | Refills: 2 | Status: SHIPPED | OUTPATIENT
Start: 2020-08-11

## 2020-08-12 ENCOUNTER — CLINICAL SUPPORT (OUTPATIENT)
Dept: REHABILITATION | Facility: HOSPITAL | Age: 40
End: 2020-08-12
Attending: PHYSICAL MEDICINE & REHABILITATION
Payer: COMMERCIAL

## 2020-08-12 DIAGNOSIS — M25.652 DECREASED RANGE OF MOTION OF BOTH HIPS: ICD-10-CM

## 2020-08-12 DIAGNOSIS — M62.9 HAMSTRING TIGHTNESS OF BOTH LOWER EXTREMITIES: ICD-10-CM

## 2020-08-12 DIAGNOSIS — R26.89 DECREASED BACK MOBILITY: ICD-10-CM

## 2020-08-12 DIAGNOSIS — M25.651 DECREASED RANGE OF MOTION OF BOTH HIPS: ICD-10-CM

## 2020-08-12 PROCEDURE — 97110 THERAPEUTIC EXERCISES: CPT | Mod: PO | Performed by: PHYSICAL THERAPIST

## 2020-08-12 NOTE — PROGRESS NOTES
"  Physical Therapy Daily Treatment Note     Name: Andrea Barnes [facility] Number: [id number]    Therapy Diagnosis:   Encounter Diagnoses   Name Primary?    Decreased range of motion of both hips     Decreased back mobility     Hamstring tightness of both lower extremities      Physician: Valerie Alfaro, *    Visit Date: 8/12/2020    Physician Orders: PT Eval and Treat hips  Medical Diagnosis from Referral:   M70.61 (ICD-10-CM) - Greater trochanteric bursitis of right hip  Evaluation Date: 7/9/2020  Authorization Period Expiration: 12/31/2020  Plan of Care Expiration: 10/9/2020  Visit # / Visits authorized: 4 / 20  PTA Visit Number: 1    Time In: 1255 pm   Time Out: 1340 pm   Total Billable Time: 45  minutes  Charges: TE -  3    Precautions: Standard    Subjective     Pt reports: feeling pretty good today. There is very slight pain that is not bad. The pain is intermittent and occurs when I turn certain ways.      He was compliant with home exercise program.  Response to previous treatment:   Functional change: None     Pain: 2 /10  Location: right hip / flank        Objective     Andrea received therapeutic exercises to develop strength, endurance, ROM, flexibility and core stabilization for 45  minutes including:    .BOLD INDICATES ACTIVITIES PERFORMED      Upright bike: x 8 minutes    Hamstring stretch with strap: 10" x 6  Piriformis stretch: 10" x 9   Supine hip flexor strength off edge of bed: 4 x 20 seconds   Supine T-ball extension     Prone   -hip extension x15  -donkey kick    x15  -hip abduction swing    x15  -sust exten 3'   -Prone rocking / prayer stretch 10" ea x 6    Side lying   -abduction x15  -adduction x15  -clams / reverse clams x15      Supine ball roll ins: orange PB x 20     MANUAL THERAPY:  RLE distraction: Hip distraction     MODALITY:  Andrea received     Home Exercises Provided and Patient Education Provided     Education provided:   -IT band standing   -hip hiking on " step  Written Home Exercises Provided: Patient instructed to cont prior HEP.  Exercises were reviewed and Andrea was able to demonstrate them prior to the end of the session.  Andrea demonstrated good  understanding of the education provided.     See EMR under Patient Instructions for exercises provided prior visit.    Assessment     Completed the activities as noted. He did not encounter any difficulty. Progress with hip strengthening.     Andrea is progressing well towards his goals.   Pt prognosis is Good.     Pt will continue to benefit from skilled outpatient physical therapy to address the deficits listed in the problem list box on initial evaluation, provide pt/family education and to maximize pt's level of independence in the home and community environment.     Pt's spiritual, cultural and educational needs considered and pt agreeable to plan of care and goals.     Anticipated barriers to physical therapy: None     Goals:     Short Term Goals: 5 weeks   1. Pts pain decreased 20 - 40% for improved functional mobility and quality of life  2. Pts PROM in hip flexion increased by 10 degrees  to enhance AROM and functional mobility  3. Pts strength in the hip musculature increased by 1/2 grade to facilitate improve active mobility in extension and abduction and functional mobility.   4. Pt to exhibit correct return demonstration of exercises for self-management and independence with HEP  5. Patient to achieve increased passive SLR increased by 10 to 15 degrees BLE for increased   Hamstring flexibility.  6. Pt to achieve increased lumbar extension by 10 degrees to restore functional stability.   Long Term Goals : 10 weeks   1. Pts pain level decreased to 75% or greater with standing activities for restoring functional mobility and ADL.  2. Pts sacral angle increased by 10 degrees or greater to increase AROM in lumbar flexion by 10 degrees to restore functional mobility and ADL  3. Pts strength in the hip  musculature increased by one grade to facilitate improved active hip extension mobility and functional stability  4. Pt will be independent with HEP for self-management.   5. Pt will increase active hip internal rotation by 5 degrees for restoring functional mobility.   6. Pt to demonstrate increased thoracic ROM to 50 degrees rotation for improved functional mobility.      Plan     Cont with PT POC as tolerated     Iam Schmitt, PT

## 2021-04-16 ENCOUNTER — PATIENT MESSAGE (OUTPATIENT)
Dept: RESEARCH | Facility: HOSPITAL | Age: 41
End: 2021-04-16

## 2021-06-08 ENCOUNTER — IMMUNIZATION (OUTPATIENT)
Dept: PRIMARY CARE CLINIC | Facility: CLINIC | Age: 41
End: 2021-06-08
Payer: COMMERCIAL

## 2021-06-08 DIAGNOSIS — Z23 NEED FOR VACCINATION: Primary | ICD-10-CM

## 2021-06-08 PROCEDURE — 0031A COVID-19,VECTOR-NR,RS-AD26,PF,0.5 ML DOSE VACCINE (JANSSEN): CPT | Mod: CV19,S$GLB,, | Performed by: INTERNAL MEDICINE

## 2021-06-08 PROCEDURE — 0031A COVID-19,VECTOR-NR,RS-AD26,PF,0.5 ML DOSE VACCINE (JANSSEN): ICD-10-PCS | Mod: CV19,S$GLB,, | Performed by: INTERNAL MEDICINE

## 2021-06-08 PROCEDURE — 91303 COVID-19,VECTOR-NR,RS-AD26,PF,0.5 ML DOSE VACCINE (JANSSEN): ICD-10-PCS | Mod: S$GLB,,, | Performed by: INTERNAL MEDICINE

## 2021-06-08 PROCEDURE — 91303 COVID-19,VECTOR-NR,RS-AD26,PF,0.5 ML DOSE VACCINE (JANSSEN): CPT | Mod: S$GLB,,, | Performed by: INTERNAL MEDICINE

## 2024-01-29 ENCOUNTER — OFFICE VISIT (OUTPATIENT)
Dept: URGENT CARE | Facility: CLINIC | Age: 44
End: 2024-01-29
Payer: COMMERCIAL

## 2024-01-29 VITALS
HEART RATE: 110 BPM | BODY MASS INDEX: 31.56 KG/M2 | OXYGEN SATURATION: 100 % | DIASTOLIC BLOOD PRESSURE: 94 MMHG | TEMPERATURE: 98 F | WEIGHT: 220.44 LBS | RESPIRATION RATE: 18 BRPM | SYSTOLIC BLOOD PRESSURE: 140 MMHG | HEIGHT: 70 IN

## 2024-01-29 DIAGNOSIS — S61.432A PUNCTURE WOUND OF LEFT HAND WITHOUT FOREIGN BODY, INITIAL ENCOUNTER: Primary | ICD-10-CM

## 2024-01-29 PROCEDURE — 90715 TDAP VACCINE 7 YRS/> IM: CPT | Mod: S$GLB,,, | Performed by: NURSE PRACTITIONER

## 2024-01-29 PROCEDURE — 99203 OFFICE O/P NEW LOW 30 MIN: CPT | Mod: 25,S$GLB,, | Performed by: NURSE PRACTITIONER

## 2024-01-29 PROCEDURE — 90471 IMMUNIZATION ADMIN: CPT | Mod: 59,S$GLB,, | Performed by: NURSE PRACTITIONER

## 2024-01-29 PROCEDURE — 73130 X-RAY EXAM OF HAND: CPT | Mod: LT,S$GLB,, | Performed by: RADIOLOGY

## 2024-01-29 PROCEDURE — 96372 THER/PROPH/DIAG INJ SC/IM: CPT | Mod: S$GLB,,, | Performed by: NURSE PRACTITIONER

## 2024-01-29 RX ORDER — KETOROLAC TROMETHAMINE 30 MG/ML
30 INJECTION, SOLUTION INTRAMUSCULAR; INTRAVENOUS
Status: COMPLETED | OUTPATIENT
Start: 2024-01-29 | End: 2024-01-29

## 2024-01-29 RX ORDER — DICLOFENAC SODIUM 75 MG/1
75 TABLET, DELAYED RELEASE ORAL 2 TIMES DAILY
Qty: 30 TABLET | Refills: 0 | Status: SHIPPED | OUTPATIENT
Start: 2024-01-29 | End: 2024-02-13

## 2024-01-29 RX ORDER — CEPHALEXIN 500 MG/1
500 CAPSULE ORAL EVERY 6 HOURS
Qty: 28 CAPSULE | Refills: 0 | Status: SHIPPED | OUTPATIENT
Start: 2024-01-29 | End: 2024-02-05

## 2024-01-29 RX ORDER — MUPIROCIN 20 MG/G
OINTMENT TOPICAL 3 TIMES DAILY
Qty: 22 G | Refills: 0 | Status: SHIPPED | OUTPATIENT
Start: 2024-01-29 | End: 2024-02-08

## 2024-01-29 RX ADMIN — KETOROLAC TROMETHAMINE 30 MG: 30 INJECTION, SOLUTION INTRAMUSCULAR; INTRAVENOUS at 05:01

## 2024-01-29 NOTE — PATIENT INSTRUCTIONS
Puncture wound of left hand without foreign body, initial encounter    -     XR HAND COMPLETE 3 VIEW LEFT    -     (In Office Administered) Tdap Vaccine  - given in clinic @ 5:00 pm       -     ketorolac injection 30 mg  - given in clinic @ 5:30 pm           -     Referral/consult to Hand Surgery (concern for flexor tenosynovitis)  Referral ordered.  Call [phone] to schedule appt         -     cephALEXin (KEFLEX) 500 MG capsule; Take 1 capsule (500 mg total) by mouth every 6 (six) hours. for 7 days  Dispense: 28 capsule; Refill: 0    -     mupirocin (BACTROBAN) 2 % ointment; Apply topically 3 (three) times daily. for 10 days  Dispense: 22 g; Refill: 0      - Tylenol as directed as needed for pain. Tylenol/acetaminophen 650-1000 mg every 6-8 hours for added pain relief. Avoid tylenol if you have a history of liver disease.     Start tomorrow:  -     diclofenac (VOLTAREN) 75 MG EC tablet; Take 1 tablet (75 mg total) by mouth 2 (two) times daily. for 15 days  Dispense: 30 tablet; Refill: 0  Patient understands to take with food and/or OTC prilosec to decrease GI side effects.         Rest - Rest the injured area   -     THUMB ORTHOSIS SPLINT UNIVERSAL FOR HOME USE     Ice - Apply ice  to affected area for the first 24-48 hours.  Ice compress to the affected area 2-3x a day for 15-20 minutes as needed for pain management. (DO NOT APPLY ICE DIRECTLY TO THE SKIN.  DO NOT LEAVE ON AFFECTED BODY PART FOR MORE THAN 20 MINUTES AT AT TIME TO AVOID INJURY TO SOFT TISSUE)      Compression - Wear ACE wrap or splint provided for compression and comfort to help reduce pain and swelling     Elevate - Elevated affected area higher than your heart to reduce swelling         Wound Care    Keep your dressing on for at least 24 hours.     Gently clean wound with soap and water twice daily.    After cleaning and patting dry, apply antibiotic ointment to the wound site with a Q-tip.    Cover with non-stick dressing (like Telfa, not  regular gauze)    Keep wound covered until well-healed    Signs of Infection:  Increase in redness (In initial stages, redness is normal at the wound edge but should not continue to increase)  Increase in pain and swelling (Similar to redness, pain and swelling is a normal part of healing, but should not continue or worsen after 3-5 days)  Yellowish drainage or fever after a few days    Itching is normal part of the healing process.  If severe or redness and water blisters develop, you may be allergic to ointment or tape.        Follow up with your PCP in 1 week or as needed if no improvement.     ED Precautions   If your symptoms worsen or fail to improve you should go to Emergency Department.

## 2024-01-29 NOTE — PROGRESS NOTES
"Subjective:      Patient ID: Andrea Travis is a 43 y.o. male.    Vitals:  height is 5' 10" (1.778 m) and weight is 100 kg (220 lb 7.4 oz). His oral temperature is 98.1 °F (36.7 °C). His blood pressure is 140/94 (abnormal) and his pulse is 110. His respiration is 18 and oxygen saturation is 100%.     Chief Complaint: Laceration      Pt is a 42 yo male presenting with puncture wound on left hand that. Injury occurred 3-4 hours ago when he stabbed himself with a pairing knife while cooking.  Pt reports his hand immediately flexed and he can not extend his hand due to severe pain.  Pt states he can manually extend his hand only if he puches it down a a surface with his other hand but it is extremely painful to do so.  Pt has been having a throbbing, cramping pain. Pt isn't able to move this thumb and cleaning it with peroxide. Pt has pain of 10. Pt has numbness in his hand and throbbing sensation around his thumb.    Laceration   The incident occurred 1 to 3 hours ago. The laceration is located on the Left hand. The laceration is 1 cm in size. The laceration mechanism was a dirty knife. The pain is at a severity of 10/10. The pain is severe. The pain has been Constant since onset. He reports no foreign bodies present. His tetanus status is unknown.       Constitution: Negative for chills, fatigue and fever.   Cardiovascular:  Negative for chest pain.   Respiratory:  Negative for chest tightness and shortness of breath.    Skin:  Positive for puncture wound (left hand).   Neurological:  Negative for headaches.      Objective:     Physical Exam   Constitutional: He is oriented to person, place, and time.   Eyes: EOM are normal.   Cardiovascular: Normal rate and regular rhythm.   Pulmonary/Chest: Effort normal and breath sounds normal. No respiratory distress.   Musculoskeletal:      Left hand: He exhibits normal capillary refill. Left thumb: Injuries: puncture wound. Comments: Left hand in flexed position  Pain with " passive extension   Neurological: He is alert and oriented to person, place, and time.   Skin: Skin is warm and dry.   Nursing note and vitals reviewed.        XR HAND COMPLETE 3 VIEW LEFT    Result Date: 1/29/2024  EXAMINATION: XR HAND COMPLETE 3 VIEW LEFT CLINICAL HISTORY: . Puncture wound without foreign body of left hand, initial encounter TECHNIQUE: PA, lateral, and oblique views of the left hand were performed. COMPARISON: None FINDINGS: There is no acute fracture or dislocation. Alignment is normal. Joint spaces are preserved. There are no erosive changes.     No acute osseous abnormality of the hand. Electronically signed by: Jorje Marx Date:    01/29/2024 Time:    17:03       Assessment:     1. Puncture wound of left hand without foreign body, initial encounter      PE exam shows puncture wound near base of left thumb, left hand in flexed position and severe pain with passive extension  Concern for flexor tenosynovitis.  Discuss case with collaborating physicians and determined outpatient urgent referral to Hand SX appropriate with ED precautions.    Plan:       Puncture wound of left hand without foreign body, initial encounter  -     XR HAND COMPLETE 3 VIEW LEFT; Future; Expected date: 01/29/2024  -     (In Office Administered) Tdap Vaccine  -     THUMB ORTHOSIS SPLINT UNIVERSAL FOR HOME USE  -     diclofenac (VOLTAREN) 75 MG EC tablet; Take 1 tablet (75 mg total) by mouth 2 (two) times daily. for 15 days  Dispense: 30 tablet; Refill: 0  -     cephALEXin (KEFLEX) 500 MG capsule; Take 1 capsule (500 mg total) by mouth every 6 (six) hours. for 7 days  Dispense: 28 capsule; Refill: 0  -     mupirocin (BACTROBAN) 2 % ointment; Apply topically 3 (three) times daily. for 10 days  Dispense: 22 g; Refill: 0  -     Ambulatory referral/consult to Hand Surgery  -     ketorolac injection 30 mg      Patient Instructions   Puncture wound of left hand without foreign body, initial encounter    -     XR HAND COMPLETE  3 VIEW LEFT    -     (In Office Administered) Tdap Vaccine  - given in clinic @ 5:00 pm       -     ketorolac injection 30 mg  - given in clinic @ 5:30 pm           -     Referral/consult to Hand Surgery (concern for flexor tenosynovitis)  Referral ordered.  Call [phone] to schedule appt         -     cephALEXin (KEFLEX) 500 MG capsule; Take 1 capsule (500 mg total) by mouth every 6 (six) hours. for 7 days  Dispense: 28 capsule; Refill: 0    -     mupirocin (BACTROBAN) 2 % ointment; Apply topically 3 (three) times daily. for 10 days  Dispense: 22 g; Refill: 0      - Tylenol as directed as needed for pain. Tylenol/acetaminophen 650-1000 mg every 6-8 hours for added pain relief. Avoid tylenol if you have a history of liver disease.     Start tomorrow:  -     diclofenac (VOLTAREN) 75 MG EC tablet; Take 1 tablet (75 mg total) by mouth 2 (two) times daily. for 15 days  Dispense: 30 tablet; Refill: 0  Patient understands to take with food and/or OTC prilosec to decrease GI side effects.         Rest - Rest the injured area   -     THUMB ORTHOSIS SPLINT UNIVERSAL FOR HOME USE     Ice - Apply ice  to affected area for the first 24-48 hours.  Ice compress to the affected area 2-3x a day for 15-20 minutes as needed for pain management. (DO NOT APPLY ICE DIRECTLY TO THE SKIN.  DO NOT LEAVE ON AFFECTED BODY PART FOR MORE THAN 20 MINUTES AT AT TIME TO AVOID INJURY TO SOFT TISSUE)      Compression - Wear ACE wrap or splint provided for compression and comfort to help reduce pain and swelling     Elevate - Elevated affected area higher than your heart to reduce swelling         Wound Care    Keep your dressing on for at least 24 hours.     Gently clean wound with soap and water twice daily.    After cleaning and patting dry, apply antibiotic ointment to the wound site with a Q-tip.    Cover with non-stick dressing (like Telfa, not regular gauze)    Keep wound covered until well-healed    Signs of Infection:  Increase in  redness (In initial stages, redness is normal at the wound edge but should not continue to increase)  Increase in pain and swelling (Similar to redness, pain and swelling is a normal part of healing, but should not continue or worsen after 3-5 days)  Yellowish drainage or fever after a few days    Itching is normal part of the healing process.  If severe or redness and water blisters develop, you may be allergic to ointment or tape.        Follow up with your PCP in 1 week or as needed if no improvement.     ED Precautions   If your symptoms worsen or fail to improve you should go to Emergency Department.